# Patient Record
(demographics unavailable — no encounter records)

---

## 2025-05-24 NOTE — HISTORY OF PRESENT ILLNESS
[Wheelchair] : a wheelchair [FreeTextEntry1] : DARON is a 68-year-old M present in the [place] office for heels infection b/l feet. Patient states he does not recall when he was at the hospital for his infection. Patient states he was at [facility] for an infection on his heels. Patient states he received surgery at [facility] for the wounds, but he does not remember when it was. Patient is staying at [facility] skilled living and rehab center. Patient states they are changing his dressings every day. Patient is present with no shoes on only dsd dressings on both feet with socks on. Patient is diabetic and losing his memory. Patient has dementia.  No offloading boots present in wheelchair  5/15 to 5/16 in the hospital as per the rehab  Per records, patient had right partial calcanectomy by tertiary podiatrist back in Jan 2025 at [facility].
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
## 2025-05-24 NOTE — HISTORY OF PRESENT ILLNESS
[Wheelchair] : a wheelchair [FreeTextEntry1] : DARON is a 68-year-old M present in the [place] office for heels infection b/l feet. Patient states he does not recall when he was at the hospital for his infection. Patient states he was at [facility] for an infection on his heels. Patient states he received surgery at [facility] for the wounds, but he does not remember when it was. Patient is staying at [facility] skilled living and rehab center. Patient states they are changing his dressings every day. Patient is present with no shoes on only dsd dressings on both feet with socks on. Patient is diabetic and losing his memory. Patient has dementia.  No offloading boots present in wheelchair  5/15 to 5/16 in the hospital as per the rehab  Per records, patient had right partial calcanectomy by tertiary podiatrist back in Jan 2025 at [facility].

## 2025-05-24 NOTE — REASON FOR VISIT
[Follow-Up Visit] : a follow-up visit for [Formal Caregiver] : formal caregiver [Foot/Ankle Ulcer] : foot/ankle ulcer
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
## 2025-05-24 NOTE — ASSESSMENT
[FreeTextEntry1] : Discussed diagnosis and treatment with patient  Discussed etiology of symptoms patient is experiencing  Right Left foot wound cleansed with wound cleanser  Selective debridement right heel wound down to and including slough/exudate  Hemostasis achieved with manual compression, silver nitrate Dressed with antimicrobial silver alginate, DSD, ABD pad, kerlix Baseline non-ambulatory in wheelchair Offloading at all times in offloading pillow boots Rehab facility to resume local wound care and offloading Discussed all signs and symptoms of local and regional infection and if they arise patient advised to come into the office or go to the emergency room.  Included the above in rehab paperwork Patient to return to the office in 3 weeks